# Patient Record
Sex: FEMALE | Employment: UNEMPLOYED | URBAN - METROPOLITAN AREA
[De-identification: names, ages, dates, MRNs, and addresses within clinical notes are randomized per-mention and may not be internally consistent; named-entity substitution may affect disease eponyms.]

---

## 2021-03-10 ENCOUNTER — OFFICE VISIT (OUTPATIENT)
Dept: FAMILY MEDICINE CLINIC | Age: 26
End: 2021-03-10
Payer: COMMERCIAL

## 2021-03-10 VITALS
HEART RATE: 80 BPM | OXYGEN SATURATION: 98 % | HEIGHT: 64 IN | BODY MASS INDEX: 19.81 KG/M2 | TEMPERATURE: 96.9 F | WEIGHT: 116 LBS | DIASTOLIC BLOOD PRESSURE: 64 MMHG | SYSTOLIC BLOOD PRESSURE: 80 MMHG

## 2021-03-10 DIAGNOSIS — Z00.00 ANNUAL PHYSICAL EXAM: Primary | ICD-10-CM

## 2021-03-10 DIAGNOSIS — Z11.4 ENCOUNTER FOR SCREENING FOR HIV: ICD-10-CM

## 2021-03-10 DIAGNOSIS — N94.6 MENSES PAINFUL: ICD-10-CM

## 2021-03-10 DIAGNOSIS — K59.09 OTHER CONSTIPATION: ICD-10-CM

## 2021-03-10 DIAGNOSIS — Z83.49 FAMILY HISTORY OF THYROID DYSFUNCTION: ICD-10-CM

## 2021-03-10 DIAGNOSIS — Z11.59 NEED FOR HEPATITIS C SCREENING TEST: ICD-10-CM

## 2021-03-10 PROCEDURE — 99385 PREV VISIT NEW AGE 18-39: CPT | Performed by: FAMILY MEDICINE

## 2021-03-10 PROCEDURE — 36415 COLL VENOUS BLD VENIPUNCTURE: CPT | Performed by: FAMILY MEDICINE

## 2021-03-10 RX ORDER — NORETHINDRONE ACETATE AND ETHINYL ESTRADIOL AND FERROUS FUMARATE 1MG-20(21)
KIT ORAL
COMMUNITY
Start: 2021-03-01 | End: 2021-08-20

## 2021-03-10 SDOH — HEALTH STABILITY: MENTAL HEALTH: HOW OFTEN DO YOU HAVE A DRINK CONTAINING ALCOHOL?: NOT ASKED

## 2021-03-10 ASSESSMENT — PATIENT HEALTH QUESTIONNAIRE - PHQ9
SUM OF ALL RESPONSES TO PHQ9 QUESTIONS 1 & 2: 0
SUM OF ALL RESPONSES TO PHQ QUESTIONS 1-9: 0

## 2021-03-10 NOTE — PROGRESS NOTES
Patient: Анна Chavez is a 22 y. 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 Is from [facility]. Has occ mild HAs, resolve with ibu. Saw derm x 2 for psoriasis. Is unsure of name of cream and foam but they are effective. MAunt and cousin have thyroid d/o. Pt is curious. Has long standing constipation. Mg+ helpful. Had gardisil vaccines in high school and last vaccine in college. Current Outpatient Medications   Medication Sig Dispense Refill    AUROVELA FE 1/20 1-20 MG-MCG per tablet        No current facility-administered medications for this visit. Patient's past medical history,surgical history, family history, medications,  and allergies  were all reviewed and updated as appropriate today. Review of Systems  abv    Physical Exam  Constitutional:       General: She is not in acute distress. Appearance: Normal appearance. She is well-developed. She is not ill-appearing. HENT:      Head: Normocephalic and atraumatic. Right Ear: Tympanic membrane, ear canal and external ear normal.      Left Ear: Tympanic membrane, ear canal and external ear normal.   Eyes:      General: No scleral icterus. Right eye: No discharge. Left eye: No discharge. Extraocular Movements: Extraocular movements intact. Conjunctiva/sclera: Conjunctivae normal.   Neck:      Musculoskeletal: Normal range of motion and neck supple. Vascular: No carotid bruit. Comments: Neg TMG  Cardiovascular:      Rate and Rhythm: Normal rate and regular rhythm. Pulses: Normal pulses. Heart sounds: Normal heart sounds. No murmur. Pulmonary:      Effort: Pulmonary effort is normal. No respiratory distress. Breath sounds: Normal breath sounds.    Abdominal:      General: Bowel sounds are normal. There is no distension. Palpations: Abdomen is soft. There is no mass. Tenderness: There is no abdominal tenderness. Musculoskeletal: Normal range of motion. Right lower leg: No edema. Left lower leg: No edema. Lymphadenopathy:      Cervical: No cervical adenopathy. Skin:     General: Skin is warm and dry. Capillary Refill: Capillary refill takes less than 2 seconds. Coloration: Skin is not jaundiced or pale. Findings: No rash. Neurological:      General: No focal deficit present. Mental Status: She is alert and oriented to person, place, and time. Cranial Nerves: No cranial nerve deficit. Deep Tendon Reflexes: Reflexes are normal and symmetric. Psychiatric:         Mood and Affect: Mood normal.         Behavior: Behavior normal.         Thought Content: Thought content normal.         Judgment: Judgment normal.           BP 80/64   Pulse 80   Temp 96.9 °F (36.1 °C) (Temporal)   Ht 5' 4\" (1.626 m)   Wt 116 lb (52.6 kg)   SpO2 98%   BMI 19.91 kg/m²     Assessment/Plan:    Ariana Giang was seen today for established new doctor.     Diagnoses and all orders for this visit:    Annual physical exam  -     Lipid Panel  -     Comprehensive Metabolic Panel  -     TSH without Reflex  -     T4, Free    Other constipation  -     TSH without Reflex  -     T4, Free    Family history of thyroid dysfunction  -     TSH without Reflex  -     T4, Free    Encounter for screening for HIV  -     HIV Screen    Need for hepatitis C screening test  -     Hepatitis C Antibody

## 2021-03-10 NOTE — PATIENT INSTRUCTIONS
Please let me know the dates of your varicella vaccine (chicken pox), HPV (gardasil), Tdap (tetanus and pertussis).

## 2021-03-10 NOTE — PROGRESS NOTES
General: Bowel sounds are normal. There is no distension. Palpations: Abdomen is soft. There is no mass. Tenderness: There is no abdominal tenderness. Musculoskeletal: Normal range of motion. Right lower leg: No edema. Left lower leg: No edema. Lymphadenopathy:      Cervical: No cervical adenopathy. Skin:     General: Skin is warm and dry. Capillary Refill: Capillary refill takes less than 2 seconds. Coloration: Skin is not jaundiced or pale. Findings: No rash. Neurological:      General: No focal deficit present. Mental Status: She is alert and oriented to person, place, and time. Cranial Nerves: No cranial nerve deficit. Deep Tendon Reflexes: Reflexes are normal and symmetric. Psychiatric:         Mood and Affect: Mood normal.         Behavior: Behavior normal.         Thought Content: Thought content normal.         Judgment: Judgment normal.           BP 80/64   Pulse 80   Temp 96.9 °F (36.1 °C) (Temporal)   Ht 5' 4\" (1.626 m)   Wt 116 lb (52.6 kg)   SpO2 98%   BMI 19.91 kg/m²     Assessment/Plan:    Fletcher Adam was seen today for established new doctor.     Diagnoses and all orders for this visit:    Annual physical exam  -     Lipid Panel  -     Comprehensive Metabolic Panel  -     TSH without Reflex  -     T4, Free    Other constipation  -     TSH without Reflex  -     T4, Free    Family history of thyroid dysfunction  -     TSH without Reflex  -     T4, Free    Encounter for screening for HIV  -     HIV Screen    Need for hepatitis C screening test  -     Hepatitis C Antibody    Menses painful  -     [address] [facility], DO, Gynecology, [facility]      nonfasting

## 2021-03-11 ENCOUNTER — PATIENT MESSAGE (OUTPATIENT)
Dept: FAMILY MEDICINE CLINIC | Age: 26
End: 2021-03-11

## 2021-03-11 LAB
A/G RATIO: 1.7 (ref 1.1–2.2)
ALBUMIN SERPL-MCNC: 4.3 G/DL (ref 3.4–5)
ALP BLD-CCNC: 41 U/L (ref 40–129)
ALT SERPL-CCNC: 11 U/L (ref 10–40)
ANION GAP SERPL CALCULATED.3IONS-SCNC: 9 MMOL/L (ref 3–16)
AST SERPL-CCNC: 17 U/L (ref 15–37)
BILIRUB SERPL-MCNC: 0.6 MG/DL (ref 0–1)
BUN BLDV-MCNC: 13 MG/DL (ref 7–20)
CALCIUM SERPL-MCNC: 9 MG/DL (ref 8.3–10.6)
CHLORIDE BLD-SCNC: 101 MMOL/L (ref 99–110)
CHOLESTEROL, TOTAL: 265 MG/DL (ref 0–199)
CO2: 27 MMOL/L (ref 21–32)
CREAT SERPL-MCNC: 0.6 MG/DL (ref 0.6–1.1)
GFR AFRICAN AMERICAN: >60
GFR NON-AFRICAN AMERICAN: >60
GLOBULIN: 2.5 G/DL
GLUCOSE BLD-MCNC: 93 MG/DL (ref 70–99)
HDLC SERPL-MCNC: 63 MG/DL (ref 40–60)
HEPATITIS C ANTIBODY INTERPRETATION: NORMAL
HIV AG/AB: NORMAL
HIV ANTIGEN: NORMAL
HIV-1 ANTIBODY: NORMAL
HIV-2 AB: NORMAL
LDL CHOLESTEROL CALCULATED: 182 MG/DL
POTASSIUM SERPL-SCNC: 4.3 MMOL/L (ref 3.5–5.1)
SODIUM BLD-SCNC: 137 MMOL/L (ref 136–145)
T4 FREE: 1.3 NG/DL (ref 0.9–1.8)
TOTAL PROTEIN: 6.8 G/DL (ref 6.4–8.2)
TRIGL SERPL-MCNC: 99 MG/DL (ref 0–150)
TSH SERPL DL<=0.05 MIU/L-ACNC: 1.35 UIU/ML (ref 0.27–4.2)
VLDLC SERPL CALC-MCNC: 20 MG/DL

## 2021-03-12 NOTE — TELEPHONE ENCOUNTER
From: Heidy Fernandez  To: Fiorella Valenzuela MD  Sent: 3/11/2021 8:47 PM EST  Subject: Test Results Question    Hi Dr. Luis Antonio Gaitan,   Thank you for all of your help at my appointment the other day. I appreciate your feedback on my test results and I am wondering if it is possible to get a more full thyroid panel including TSH, TPOab, TgAB, TSI, Free T3, Free T4, Reverse T3, T3, T4, Total T3? Thank you!   Heidy Fernandez

## 2021-08-20 ENCOUNTER — APPOINTMENT (OUTPATIENT)
Dept: ULTRASOUND IMAGING | Age: 26
End: 2021-08-20
Payer: COMMERCIAL

## 2021-08-20 ENCOUNTER — HOSPITAL ENCOUNTER (EMERGENCY)
Age: 26
Discharge: HOME | End: 2021-08-20
Payer: COMMERCIAL

## 2021-08-20 ENCOUNTER — APPOINTMENT (OUTPATIENT)
Dept: CT IMAGING | Age: 26
End: 2021-08-20
Payer: COMMERCIAL

## 2021-08-20 VITALS
HEART RATE: 76 BPM | WEIGHT: 120 LBS | DIASTOLIC BLOOD PRESSURE: 73 MMHG | RESPIRATION RATE: 16 BRPM | OXYGEN SATURATION: 100 % | HEIGHT: 64 IN | TEMPERATURE: 98.2 F | SYSTOLIC BLOOD PRESSURE: 105 MMHG | BODY MASS INDEX: 20.49 KG/M2

## 2021-08-20 DIAGNOSIS — R11.0 NAUSEA: ICD-10-CM

## 2021-08-20 DIAGNOSIS — N83.299 FUNCTIONAL CYST OF OVARY: ICD-10-CM

## 2021-08-20 DIAGNOSIS — N83.292 COMPLEX CYST OF LEFT OVARY: ICD-10-CM

## 2021-08-20 DIAGNOSIS — R10.30 LOWER ABDOMINAL PAIN: Primary | ICD-10-CM

## 2021-08-20 LAB
A/G RATIO: 1.6 (ref 1.1–2.2)
ALBUMIN SERPL-MCNC: 4.5 G/DL (ref 3.4–5)
ALP BLD-CCNC: 52 U/L (ref 40–129)
ALT SERPL-CCNC: 9 U/L (ref 10–40)
ANION GAP SERPL CALCULATED.3IONS-SCNC: 10 MMOL/L (ref 3–16)
AST SERPL-CCNC: 17 U/L (ref 15–37)
BASOPHILS ABSOLUTE: 0.1 K/UL (ref 0–0.2)
BASOPHILS RELATIVE PERCENT: 0.6 %
BILIRUB SERPL-MCNC: 2.6 MG/DL (ref 0–1)
BILIRUBIN URINE: NEGATIVE
BLOOD, URINE: NEGATIVE
BUN BLDV-MCNC: 11 MG/DL (ref 7–20)
CALCIUM SERPL-MCNC: 9.4 MG/DL (ref 8.3–10.6)
CHLORIDE BLD-SCNC: 103 MMOL/L (ref 99–110)
CLARITY: CLEAR
CO2: 25 MMOL/L (ref 21–32)
COLOR: YELLOW
CREAT SERPL-MCNC: 0.6 MG/DL (ref 0.6–1.1)
EOSINOPHILS ABSOLUTE: 0 K/UL (ref 0–0.6)
EOSINOPHILS RELATIVE PERCENT: 0.4 %
GFR AFRICAN AMERICAN: >60
GFR NON-AFRICAN AMERICAN: >60
GLOBULIN: 2.8 G/DL
GLUCOSE BLD-MCNC: 102 MG/DL (ref 70–99)
GLUCOSE URINE: NEGATIVE MG/DL
HCG(URINE) PREGNANCY TEST: NEGATIVE
HCT VFR BLD CALC: 43.6 % (ref 36–48)
HEMOGLOBIN: 15.1 G/DL (ref 12–16)
KETONES, URINE: NEGATIVE MG/DL
LEUKOCYTE ESTERASE, URINE: NEGATIVE
LIPASE: 28 U/L (ref 13–60)
LYMPHOCYTES ABSOLUTE: 1.6 K/UL (ref 1–5.1)
LYMPHOCYTES RELATIVE PERCENT: 17.3 %
MCH RBC QN AUTO: 32.2 PG (ref 26–34)
MCHC RBC AUTO-ENTMCNC: 34.6 G/DL (ref 31–36)
MCV RBC AUTO: 93.2 FL (ref 80–100)
MICROSCOPIC EXAMINATION: NORMAL
MONOCYTES ABSOLUTE: 0.5 K/UL (ref 0–1.3)
MONOCYTES RELATIVE PERCENT: 5.3 %
NEUTROPHILS ABSOLUTE: 7.2 K/UL (ref 1.7–7.7)
NEUTROPHILS RELATIVE PERCENT: 76.4 %
NITRITE, URINE: NEGATIVE
PDW BLD-RTO: 12.4 % (ref 12.4–15.4)
PH UA: 7.5 (ref 5–8)
PLATELET # BLD: 206 K/UL (ref 135–450)
PMV BLD AUTO: 8.2 FL (ref 5–10.5)
POTASSIUM REFLEX MAGNESIUM: 4.2 MMOL/L (ref 3.5–5.1)
PROTEIN UA: NEGATIVE MG/DL
RBC # BLD: 4.67 M/UL (ref 4–5.2)
SODIUM BLD-SCNC: 138 MMOL/L (ref 136–145)
SPECIFIC GRAVITY UA: <=1.005 (ref 1–1.03)
TOTAL PROTEIN: 7.3 G/DL (ref 6.4–8.2)
URINE REFLEX TO CULTURE: NORMAL
URINE TYPE: NORMAL
UROBILINOGEN, URINE: 0.2 E.U./DL
WBC # BLD: 9.4 K/UL (ref 4–11)

## 2021-08-20 PROCEDURE — 2580000003 HC RX 258: Performed by: PHYSICIAN ASSISTANT

## 2021-08-20 PROCEDURE — 74177 CT ABD & PELVIS W/CONTRAST: CPT

## 2021-08-20 PROCEDURE — 76830 TRANSVAGINAL US NON-OB: CPT

## 2021-08-20 PROCEDURE — 93975 VASCULAR STUDY: CPT

## 2021-08-20 PROCEDURE — 81003 URINALYSIS AUTO W/O SCOPE: CPT

## 2021-08-20 PROCEDURE — 6360000004 HC RX CONTRAST MEDICATION: Performed by: PHYSICIAN ASSISTANT

## 2021-08-20 PROCEDURE — 84703 CHORIONIC GONADOTROPIN ASSAY: CPT

## 2021-08-20 PROCEDURE — 85025 COMPLETE CBC W/AUTO DIFF WBC: CPT

## 2021-08-20 PROCEDURE — 76705 ECHO EXAM OF ABDOMEN: CPT

## 2021-08-20 PROCEDURE — 83690 ASSAY OF LIPASE: CPT

## 2021-08-20 PROCEDURE — 80053 COMPREHEN METABOLIC PANEL: CPT

## 2021-08-20 PROCEDURE — 99283 EMERGENCY DEPT VISIT LOW MDM: CPT

## 2021-08-20 RX ORDER — 0.9 % SODIUM CHLORIDE 0.9 %
1000 INTRAVENOUS SOLUTION INTRAVENOUS ONCE
Status: COMPLETED | OUTPATIENT
Start: 2021-08-20 | End: 2021-08-20

## 2021-08-20 RX ORDER — NAPROXEN 500 MG/1
500 TABLET ORAL 2 TIMES DAILY WITH MEALS
Qty: 30 TABLET | Refills: 0 | Status: SHIPPED | OUTPATIENT
Start: 2021-08-20

## 2021-08-20 RX ADMIN — SODIUM CHLORIDE 1000 ML: 9 INJECTION, SOLUTION INTRAVENOUS at 09:30

## 2021-08-20 RX ADMIN — IOPAMIDOL 75 ML: 755 INJECTION, SOLUTION INTRAVENOUS at 10:07

## 2021-08-20 ASSESSMENT — PAIN DESCRIPTION - LOCATION: LOCATION: ABDOMEN

## 2021-08-20 ASSESSMENT — ENCOUNTER SYMPTOMS
SHORTNESS OF BREATH: 0
NAUSEA: 1
DIARRHEA: 0
ABDOMINAL PAIN: 1
EYE PAIN: 0
BACK PAIN: 0
COUGH: 0
VOMITING: 0

## 2021-08-20 ASSESSMENT — PAIN SCALES - GENERAL: PAINLEVEL_OUTOF10: 5

## 2021-08-20 ASSESSMENT — PAIN DESCRIPTION - ORIENTATION: ORIENTATION: LOWER;MID

## 2021-08-20 ASSESSMENT — PAIN DESCRIPTION - PAIN TYPE: TYPE: ACUTE PAIN

## 2021-08-20 ASSESSMENT — PAIN DESCRIPTION - DESCRIPTORS: DESCRIPTORS: DULL

## 2021-08-20 NOTE — ED PROVIDER NOTES
[facility]  ED  EMERGENCY DEPARTMENT ENCOUNTER        Pt Name: Elsa Cary  MRN: [id number]  [place] 1995  Date of evaluation: 8/20/2021  Provider: KALPANA Guillaume  PCP: Tapan Victoria MD  Note Started: 9:54 AM EDT       YOU. I have evaluated this patient. My supervising physician was available for consultation. CHIEF COMPLAINT       Chief Complaint   Patient presents with    Abdominal Pain     mid low abdominal pain. called PCP and was told to go to the ED for further evaluation     Nausea       HISTORY OF PRESENT ILLNESS   (Location, Timing/Onset, Context/Setting, Quality, Duration, Modifying Factors, Severity, Associated Signs and Symptoms)  Note limiting factors. Chief Complaint: abdominal pain     Elsa Cary is a 32 y.o. female who presents to the emergency department for evaluation of lower abdominal pain symptoms began last night. Pain is located in the suprapubic and right lower quadrant. Reports associated nausea, dysuria. Rates her pain as a 5 out of 10 at rest. However with movement or pushing on it has gone up to a seven or an eight. Denies hematuria or vaginal bleeding/discharge. She does have a history of pain surrounding the time of her periods but states this is more severe. She called her PCP who advised going to the emergency department to rule out appendicitis. She denies fever, chills    Nursing Notes were all reviewed and agreed with or any disagreements were addressed in the HPI. REVIEW OF SYSTEMS    (2-9 systems for level 4, 10 or more for level 5)     Review of Systems   Constitutional: Negative for chills, fatigue and fever. Eyes: Negative for pain. Respiratory: Negative for cough and shortness of breath. Cardiovascular: Negative for chest pain. Gastrointestinal: Positive for abdominal pain and nausea. Negative for diarrhea and vomiting. Genitourinary: Positive for dysuria.    Musculoskeletal: Negative for back pain, neck pain and neck stiffness. Skin: Negative for rash. Neurological: Negative for dizziness and headaches. Psychiatric/Behavioral: Negative for confusion. Positives and Pertinent negatives as per HPI. Except as noted above in the ROS, all other systems were reviewed and negative. PAST MEDICAL HISTORY     Past Medical History:   Diagnosis Date    Headache     mild, takes ibu prn, asso'd with weather change    Psoriasis     dz'd age 16, on scalp, back, chest, flares with stress         SURGICAL HISTORY   History reviewed. No pertinent surgical history. CURRENTMEDICATIONS       Previous Medications    No medications on file         ALLERGIES     Pcn [penicillins]    FAMILYHISTORY       Family History   Problem Relation Age of Onset    Alzheimer's Disease Maternal Grandmother     Cancer Paternal Grandmother         breast cancer in 46s, doing well at 80    Colon Cancer Paternal Grandfather     Anxiety Disorder Father     Depression Father           SOCIAL HISTORY       Social History     Tobacco Use    Smoking status: Never Smoker    Smokeless tobacco: Never Used   Vaping Use    Vaping Use: Never used   Substance Use Topics    Alcohol use: Yes     Alcohol/week: 1.0 - 2.0 standard drinks     Types: 1 - 2 Glasses of wine per week     Comment: Social     Drug use: Never       SCREENINGS             PHYSICAL EXAM    (up to 7 for level 4, 8 or more for level 5)     ED Triage Vitals [08/20/21 0912]   BP Temp Temp Source Pulse Resp SpO2 Height Weight   105/72 98.2 °F (36.8 °C) Oral 90 14 100 % 5' 4\" (1.626 m) 120 lb (54.4 kg)       Physical Exam  Vitals and nursing note reviewed. Constitutional:       General: She is not in acute distress. Appearance: She is well-developed. She is not ill-appearing, toxic-appearing or diaphoretic. HENT:      Head: Normocephalic and atraumatic. Eyes:      General:         Right eye: No discharge. Left eye: No discharge.    Pulmonary:      Effort: No respiratory distress. Breath sounds: No stridor. Abdominal:      Tenderness: There is abdominal tenderness in the right lower quadrant and suprapubic area. There is no right CVA tenderness or left CVA tenderness. Musculoskeletal:         General: Normal range of motion. Cervical back: Normal range of motion and neck supple. Skin:     General: Skin is warm and dry. Coloration: Skin is not pale. Neurological:      Mental Status: She is alert and oriented to person, place, and time. Comments: No gross facial drooping. Moves all 4 extremities spontaneously. Psychiatric:         Behavior: Behavior normal.         DIAGNOSTIC RESULTS   LABS:    Labs Reviewed   COMPREHENSIVE METABOLIC PANEL W/ REFLEX TO MG FOR LOW K - Abnormal; Notable for the following components:       Result Value    Glucose 102 (*)     Total Bilirubin 2.6 (*)     ALT 9 (*)     All other components within normal limits    Narrative:     Performed at:  [place] [address] [employer]   Phone [phone]   CBC WITH AUTO DIFFERENTIAL    Narrative:     Performed at:  [place] [address] [employer]   Phone [phone]   LIPASE    Narrative:     Performed at:  [address], [facility] [employer]   Phone [phone]   URINE RT REFLEX TO CULTURE    Narrative:     Performed at:  [place] [address] [employer]   Phone [phone]   PREGNANCY, URINE    Narrative:     Performed at:  [address], [facility] [employer]   Phone [phone]       When ordered only abnormal lab results are displayed. All other labs were within normal range or not returned as of this dictation. EKG:  When ordered, EKG's are interpreted by the Emergency Department Physician in the absence of a cardiologist.  Please see their note for interpretation of EKG. RADIOLOGY:   Non-plain film images such as CT, Ultrasound and MRI are read by the radiologist. Plain radiographic images are visualized and preliminarily interpreted by the ED Provider with the below findings:        Interpretation per the Radiologist below, if available at the time of this note:    [address]   Final Result   Unremarkable right upper quadrant ultrasound. US DUP ABD PEL RETRO SCROT COMPLETE   Final Result   3 cm complex cystic lesion within the left ovary, for which follow-up   recommendations are as below. Complex cyst or cystic neoplasm are   considerations. 1.8 cm simple right ovarian cyst, for which ultrasound follow-up is not   mandatory. Flow was seen within each ovary, arguing against acute torsion. RECOMMENDATIONS:   3 cm indeterminate ovarian cyst.  Recommend pelvic US follow-up in 6-12   weeks; if unchanged, continue follow-up with US OR MRI with IV contrast - if   follow-up studies do not confirm endometrioma or dermoid, consider surgical   evaluation. Reference: Radiology 2010 Sep;256(3):383-38            US NON OB TRANSVAGINAL   Final Result   3 cm complex cystic lesion within the left ovary, for which follow-up   recommendations are as below. Complex cyst or cystic neoplasm are   considerations. 1.8 cm simple right ovarian cyst, for which ultrasound follow-up is not   mandatory. Flow was seen within each ovary, arguing against acute torsion. RECOMMENDATIONS:   3 cm indeterminate ovarian cyst.  Recommend pelvic US follow-up in 6-12   weeks; if unchanged, continue follow-up with US OR MRI with IV contrast - if   follow-up studies do not confirm endometrioma or dermoid, consider surgical   evaluation. Reference: Radiology 2010 Sep;256(3):649-44            CT ABDOMEN PELVIS W IV CONTRAST Additional Contrast? None   Final Result   1.  No findings to indicate appendicitis   2. Mild periportal low attenuation in the liver suggests periportal edema   which can be seen in acute hepatocellular disease. Correlate with LFTs           No results found. PROCEDURES   Unless otherwise noted below, none     Procedures    CRITICAL CARE TIME   N/A    CONSULTS:  None      EMERGENCY DEPARTMENT COURSE and DIFFERENTIAL DIAGNOSIS/MDM:   Vitals:    Vitals:    08/20/21 0912 08/20/21 1033   BP: 105/72 102/65   Pulse: 90 86   Resp: 14 16   Temp: 98.2 °F (36.8 °C)    TempSrc: Oral    SpO2: 100% 100%   Weight: 120 lb (54.4 kg)    Height: 5' 4\" (1.626 m)        Patient was given the following medications:  Medications   0.9 % sodium chloride bolus (0 mLs Intravenous Stopped 8/20/21 1033)   iopamidol (ISOVUE-370) 76 % injection 75 mL (75 mLs Intravenous Given 8/20/21 1007)           Differential diagnosis: Ischemic Bowel, Bowel Obstruction, PUD, GERD, Acute Cholecystitis, Pancreatitis, Hepatitis, Colitis, SMA Syndrome, Mesenteric Steal Syndrome, Splanchnic Vein Thrombosis, Acute Appendicitis, Diverticulitis, IBS, Constipation, Pyelonephritis, UTI, STD    Patient seen and examined today for RLQ/suprapubic pain. See HPI for patient presentation. Patient is in no acute distress, nontoxic, afebrile with unremarkable vital signs. I have reviewed the patient's laboratory results. The patient has normal WBCs, hematocrit and platelets. They have no severe electrolyte abnormality or renal impairment. Elevated bilirubin at 2.6 with normal LFTs. Lipase was normal. Urinalysis shows no sign of infection and urine HCG is negative. CT was negative for acute appendicitis. She had suggestion of mild periportal edema on CT however no elevation of LFTs, her bilirubin was elevated, thus RUQ US obtained which was negative for any acute findings in gallbladder/liver. Advised follow up with PCP for reevaluation of elevated bilirubin.   US showed 3 cm complex left ovarian cyst and 1.8 cm simple right ovarian cyst. Discussed findings with patient including OBGYN referral and follow up for re-imaging in the next few weeks. Prescribed naproxen. Patient declined pain medication in the ED and was given heating pad. Instructed to return if she has any worsening pelvic pain or symptoms. The patient is nontoxic with a reassuring abdominal exam. she has remained hemodynamically stable throughout her ED course. Based on history, physical exam, risk factors, and tests my suspicion for bowel obstruction, incarcerated hernia, acute pancreatitis, intra-abdominal abscess, perforated viscus, diverticulitis, cholecystitis, appendicitis is very low. There is no evidence of peritonitis, sepsis or toxicity at this time and I see nothing that would suggest an acute abdomen at this time. I believe patient's presentation does not warrant further workup in the emergency department and is stable for discharge home. I discussed with Camryn Waldron and/or family the exam results, diagnosis, care, prognosis, reasons to return to the emergency department including worsening pain, high fevers, intractable vomiting or bleeding, or any new or worsening symptoms, and the importance of follow up with provider in 24-48 hours. They verbalized understanding and were discharged in stable condition. Camryn Waldron is well appearing, non-toxic, and afebrile at the time of discharge. FINAL IMPRESSION      1. Lower abdominal pain    2. Nausea    3. Complex cyst of left ovary    4.  Functional cyst of ovary, right          DISPOSITION/PLAN   DISPOSITION Decision To Discharge 08/20/2021 12:28:12 PM      PATIENT REFERRED TO:  Franky Bray MD  [address], [address]  [phone]      ED follow up with SACHA BLUM [facility] for further evaluation of complex left ovarian cyst    Pearl Ly MD  [address] [facility]  [phone]      ED follow up    [facility]  ED  [place] [address] [facility]  [phone]    If symptoms worsen      DISCHARGE MEDICATIONS:  New Prescriptions    NAPROXEN (NAPROSYN) 500 MG TABLET    Take 1 tablet by mouth 2 times daily (with meals)       DISCONTINUED MEDICATIONS:  Discontinued Medications    AUROVELA FE 1/20 1-20 MG-MCG PER TABLET                  (Please note that portions of this note were completed with a voice recognition program.  Efforts were made to edit the dictations but occasionally words are mis-transcribed.)    Edison Severin, PA (electronically signed)            Edison Severin, PA  08/20/21 9268

## 2021-10-12 ENCOUNTER — OFFICE VISIT (OUTPATIENT)
Dept: BEHAVIORAL/MENTAL HEALTH CLINIC | Age: 26
End: 2021-10-12
Payer: COMMERCIAL

## 2021-10-12 DIAGNOSIS — F43.21 ADJUSTMENT DISORDER WITH DEPRESSED MOOD: ICD-10-CM

## 2021-10-12 DIAGNOSIS — Z56.0 PROBLEM WITH BEING UNEMPLOYED: ICD-10-CM

## 2021-10-12 DIAGNOSIS — F41.9 ANXIETY: Primary | ICD-10-CM

## 2021-10-12 PROCEDURE — 90791 PSYCH DIAGNOSTIC EVALUATION: CPT | Performed by: COUNSELOR

## 2021-10-12 SDOH — ECONOMIC STABILITY - INCOME SECURITY: UNEMPLOYMENT, UNSPECIFIED: Z56.0

## 2021-10-12 NOTE — PROGRESS NOTES
[facility]    Time Start: 9:00am    Time End: 10:00am  Date of Service:  10/12/2021    Basis of Evaluation:   [x]  Clinical Interview  [x]  Review of Medical Record    [x] Patient Health Questionnaire (PHQ)  []  Interview family member    Presenting Concerns:  Mariam De Santiago is a 32 y.o. who was self-referred, due to concerns about adjustment and anxiety. Moy Llamas reported the following symptoms of depression, related to adjustment: anhedonia, depressed mood, feelings of worthlessness/guilt and hopelessness. She also reported crying spells, sense of worthlessness, sense of hopelessness, irritability, pessimism, lack of pleasure, sad mood and anxiousness. In addition, Moy Llamas reported symptoms of excessive worry, agitation and depression. There is no evidence of toro or a thought disorder. She reported that her symptoms began [facility], but has gotten worse since moving to [place], [place]. \" Additional exacerbating stressors include family issues (strained relationship with parents, specifically her mother), employment concern (does not perceive she can work due to [employer] current job as a ); reports chronic GI pain; and recent move ( , Aarti, in December 2020, and relocated to [place], [place], from [place] in January, 2021). Previous behavioral health treatment history: None  has a current medication list which includes the following prescription(s): naproxen.     PHQ Scores 10/16/2021 3/10/2021   PHQ2 Score 2 0   PHQ9 Score 2 0     Interpretation of Total Score Depression Severity: 1-4 = Minimal depression, 5-9 = Mild depression, 10-14 = Moderate depression, 15-19 = Moderately severe depression, 20-27 = Severe depression    Moy Llamas    Mental Status:  Appearance: age appropriate, casually dressed and within normal Limits   Affect:  consistent with expectations based upon mood   Attitude: Cooperative, [place] and Friendly   Mood:   Dysphoric, Apathetic and Anxious   Thought Process:  Linear and goal directed   Delusions: no evidence of delusions   Perceptions: No perceptual disturbance   Behavior:   open, friendly and cooperative   Psychomotor: Within normal limits   Speech: Within normal limits   Eye Contact: good   Orientation:  oriented to person, place, time, and general circumstances   Judgment & Insight:  normal insight and judgment     Risk Assessment:  Current Suicide Risk:  no suicidal ideation, nonsuicidal morbid ideation  Current Homicide Risk:  no homocidal ideation    Carlene Price reported no previous history of suicidal ideation or behavior. Social History/Functioning:  Carlene Price is currently living with family (, Gino Locke) and reported a good social support network (, Gino Locke; several close friends from [place]; several friends in [place]). .She denied any current cultural or spiritual concerns. Social History     Socioeconomic History    Marital status: Unknown     Spouse name: Not on file    Number of children: Not on file    Years of education: Not on file    Highest education level: Not on file   Occupational History    Not on file   Tobacco Use    Smoking status: Never Smoker    Smokeless tobacco: Never Used   Vaping Use    Vaping Use: Never used   Substance and Sexual Activity    Alcohol use: Yes     Alcohol/week: 1.0 - 2.0 standard drinks     Types: 1 - 2 Glasses of wine per week     Comment: Social     Drug use: Never    Sexual activity: Not on file   Other Topics Concern    Not on file   Social History Narrative    Not on file     Social Determinants of Health     Financial Resource Strain:     Difficulty of Paying Living Expenses:    Food Insecurity:     Worried About Running Out of Food in the Last Year:     [address] in the Last Year:    Transportation Needs:     Lack of Transportation (Medical):      Lack of Transportation (Non-Medical):    Physical Activity:     Days of Exercise per Week:     Minutes of Exercise per Session:    Stress:     Feeling of Stress :    Social Connections:     Frequency of Communication with Friends and Family:     Frequency of Social Gatherings with Friends and Family:     Attends Evangelical Services:     Active Member of Clubs or Organizations:     Attends Club or Organization Meetings:     Marital Status:    Intimate Partner Violence:     Fear of Current or Ex-Partner:     Emotionally Abused:     Physically Abused:     Sexually Abused:        Past Medical History:   Diagnosis Date    Headache     mild, takes ibu prn, asso'd with weather change    Psoriasis     dz'd age 16, on scalp, back, chest, flares with stress       Diagnosis:   Diagnosis Orders   1. Anxiety     2. Adjustment disorder with depressed mood     3. Problem with being unemployed         Strengths: Motivated for treatment, Good social support, Good premorbid functioning and Appears psychologically minded   Limitations: None    Patient Response to Plan/Recommendations: Today, we discussed psychoeducation of treatment for anxiety, adjustment disorder with depressed mood, and problem with being unemployed. Primary goals of therapy were collaboratively identified as decrease symptoms of anxiety; relieve symptoms of adjustment disorder to help Shawn Guerra achieve a level of functioning comparable to what was demonstrated prior to relocation to [place], [place]; and career planning. Discussed confidentiality and limits of confidentiality as it applies to treatment within [facility] and my role as member of the medical treatment team.     Assessment, Impressions and Plan:  Shawn Guerra is a typical 32 y.o. old female who presents with mild anxiety and adjustment disorder symptoms that are somewhat interfering with her family and social functioning. Shawn Guerra expresses fair insight into her symptoms.  Shawn Guerra will likely benefit from Cognitive Behavioral therapy to challenge irrational thoughts, Psychoanalysis for insight development, and Dialectical Behavioral Therapy to address emotional management. Her symptoms are in the mild range and she will likely need 10-12 therapy sessions. Initial Treatment Plan/Recommendations:  No follow-ups on file. [facility].         Electronically signed by Joaquim Walker [facility], Maurice LPCC-S  Licensed Professional Clinical Counselor  Director of [address] [facility] and [facility] Medicine Residency Program at [facility]

## 2021-10-16 ASSESSMENT — PATIENT HEALTH QUESTIONNAIRE - PHQ9
SUM OF ALL RESPONSES TO PHQ QUESTIONS 1-9: 2
SUM OF ALL RESPONSES TO PHQ QUESTIONS 1-9: 2
SUM OF ALL RESPONSES TO PHQ9 QUESTIONS 1 & 2: 2
SUM OF ALL RESPONSES TO PHQ QUESTIONS 1-9: 2
2. FEELING DOWN, DEPRESSED OR HOPELESS: 1
1. LITTLE INTEREST OR PLEASURE IN DOING THINGS: 1

## 2021-10-19 ENCOUNTER — OFFICE VISIT (OUTPATIENT)
Dept: BEHAVIORAL/MENTAL HEALTH CLINIC | Age: 26
End: 2021-10-19
Payer: COMMERCIAL

## 2021-10-19 DIAGNOSIS — F43.21 ADJUSTMENT DISORDER WITH DEPRESSED MOOD: ICD-10-CM

## 2021-10-19 DIAGNOSIS — Z56.0 PROBLEM WITH BEING UNEMPLOYED: ICD-10-CM

## 2021-10-19 DIAGNOSIS — F41.9 ANXIETY: Primary | ICD-10-CM

## 2021-10-19 PROCEDURE — 90837 PSYTX W PT 60 MINUTES: CPT | Performed by: COUNSELOR

## 2021-10-19 SDOH — ECONOMIC STABILITY - INCOME SECURITY: UNEMPLOYMENT, UNSPECIFIED: Z56.0

## 2021-10-24 NOTE — PROGRESS NOTES
1000 [facility]    Time Start: 9:00am   Time End: 10:15am   Date of Service:  10/19/2021    Subjective:  Vanessa Nava shared she has been \"about the same\" since last session, and focused on her frustrations related to her realtor, whom cancelled an open house the day it was scheduled due to illness and has not been effectively communicating with her for weeks. Vanessa Nava and her  are interested in selling their home as expediently as possible, and perceive poor communication from their realtor ass resulting in delayed sale of their home. Vanessa Nava indicates she wants to spend the majority of her session discussing and processing issues related to growing up with \"difficult parents\" and the affects of this on her as an adult. As an only child, Vanessa Nava reports anticipatory anxiety over the stress in planning for caring for her aging parents. With further discussion, Vanessa Nava agreed that having a conversation with her parents about their preferences for elderly care would be the best method for gathering information to best make decisions in the present, in preparation for the future. Vanessa Nava committed to having the conversation with her mother first.     Additionally, Vanessa Nava commented that due to a high level of anxiety and depression presented by her parents while growing up, she often experiences a high level of anxiety when returning home for a visit. She experiences a high level of stress and tension before, during, and after visiting, and recognizes there was a \"sense of trauma\" that resurfaces in the company of her parents and family. Clinician discussed boundary setting with her parents, and provided examples, including where she stays when she visits her parents in [place]. Vanessa Nava was challenged to spend the next week considering boundaries that could be appropriately set with her parents. Follow-up next session.     Objective:  Mental Status:  Appearance: age appropriate, casually dressed and within normal Limits   Affect:  consistent with expectations based upon mood   Attitude: Cooperative and Engageable   Mood:   Dysphoric and Anxious   Thought Process:  Linear and goal directed   Delusions: no evidence of delusions   Perceptions: No perceptual disturbance   Behavior:   open and cooperative   Psychomotor: Within normal limits   Speech: Within normal limits   Eye Contact: good   Orientation:  oriented to person, place, time, and general circumstances   Judgment & Insight:  normal insight and judgment     Risk Assessment:  Current Suicide Risk:  no suicidal ideation, nonsuicidal morbid ideation  Current Homicide Risk:  no homocidal ideation    Diagnosis:   Diagnosis Orders   1. Anxiety     2. Adjustment disorder with depressed mood     3. Problem with being unemployed         Plan/Recommendations:  Continue with clinical therapy treatment using evidence-based techniques to treat anxiety, adjustment disorder with depressed mood, and problem with being unemployed. Primary goals of therapy remain collaboratively identified as develop coping skills to improve problem-solving skills, communication skills, and stress management skills, related to Adjustment Disorder, as well as to decrease symptoms of anxiety; and career planning. Harriett Looney Cognitive Behavioral therapy to challenge irrational thoughts, Psychoanalysis for insight development, and Dialectical Behavioral Therapy to address emotional management.       Electronically signed by Beti Qureshi [facility] KIMBERLEY MAR Ed.D., [facility]  Licensed Professional Clinical Counselor  Director of [address] [facility] and [facility] Medicine Residency Program at [address] [place] [address]

## 2021-10-26 ENCOUNTER — OFFICE VISIT (OUTPATIENT)
Dept: BEHAVIORAL/MENTAL HEALTH CLINIC | Age: 26
End: 2021-10-26
Payer: COMMERCIAL

## 2021-10-26 DIAGNOSIS — Z56.0 PROBLEM WITH BEING UNEMPLOYED: ICD-10-CM

## 2021-10-26 DIAGNOSIS — F41.9 ANXIETY: Primary | ICD-10-CM

## 2021-10-26 DIAGNOSIS — F43.21 ADJUSTMENT DISORDER WITH DEPRESSED MOOD: ICD-10-CM

## 2021-10-26 PROCEDURE — 90837 PSYTX W PT 60 MINUTES: CPT | Performed by: COUNSELOR

## 2021-10-26 SDOH — ECONOMIC STABILITY - INCOME SECURITY: UNEMPLOYMENT, UNSPECIFIED: Z56.0

## 2021-10-26 NOTE — PROGRESS NOTES
1000 [facility]    Time Start: 9:00am   Time End: 10:00am  Date of Service:  10/26/2021    Subjective:  Delbert Lemon reports she remains anxious about the possibility of moving to [facility] for her 's job next year if the option year of his contract is not picked up by his team. She communicated a fear of living far from her family, potentially having a baby while distanced from family, and feeling isolated and alone if no friends are acquired while abroad. Clinician challenged fears, specifically related to her expressed interest in visiting her family \"quarterly\" and having a baby, noting that the contract she explained would only have her and her  in [place] for a year. She agreed, and remains more cautious of the unknown, rather than fearful of not seeing family or starting a family while abroad. Delbert Lemon was asked to consider a scenario where she could be living in [place] (or anywhere her  signs a contract), and how it might be possible for her to lean into areas of identity development to make the most of her time wherever she moves. Delbert Lemon struggled to identify aspects of her identity, and feels there is Marizol Hives lot to figure out. \" Clinician invited Delbert Lemon to develop a list of her core values to share out next session. She was agreeable. Last, Delbert Lemon went into moderate detail about pain and discomfort she continues to feel in her lower abdomen, especially during menstruation. She finds it difficult to have sex without pain, and is worried there might be something medically wrong, or something that may prevent her from being able to start a family when ready. She explained her OB promoting the use of birth control; however, Delbert Lemon is not interested in using hormones to mitigate symptoms, and shared a preference to pursue a healthy diet and exercise plan, as well as holistic approaches to symptom relief instead.  Continue to assess symptoms each session. Objective:  Mental Status:  Appearance: age appropriate, casually dressed and within normal Limits   Affect:  consistent with expectations based upon mood   Attitude: Cooperative and Engageable   Mood:   Dysphoric, Apathetic and Anxious   Thought Process:  Linear and goal directed   Delusions: no evidence of delusions   Perceptions: No perceptual disturbance   Behavior:   open and cooperative   Psychomotor: Within normal limits   Speech: Within normal limits   Eye Contact: good   Orientation:  oriented to person, place, time, and general circumstances   Judgment & Insight:  normal insight and judgment     Risk Assessment:  Current Suicide Risk:  no suicidal ideation, nonsuicidal morbid ideation  Current Homicide Risk:  no homocidal ideation    Diagnosis:   Diagnosis Orders   1. Anxiety     2. Adjustment disorder with depressed mood     3. Problem with being unemployed         Plan/Recommendations:  Continue with clinical therapy treatment using evidence-based techniques to treat anxiety, adjustment disorder with depressed mood, and problem with being unemployed. Primary goals of therapy remain collaboratively identified as develop coping skills to improve problem-solving skills, communication skills, and stress management skills, related to Adjustment Disorder, as well as to decrease symptoms of anxiety; and career planning. Kenna Penny Cognitive Behavioral therapy to challenge irrational thoughts, Psychoanalysis for insight development, and Dialectical Behavioral Therapy to address emotional management.     Electronically signed by Francis Bazzi [facility] KIMBERLEY MAR Ed.D., LPCC-S  Licensed Professional Clinical Counselor  Director of [address] [facility] and [facility] Medicine Residency Program at [facility]

## 2021-11-02 ENCOUNTER — OFFICE VISIT (OUTPATIENT)
Dept: BEHAVIORAL/MENTAL HEALTH CLINIC | Age: 26
End: 2021-11-02
Payer: COMMERCIAL

## 2021-11-02 DIAGNOSIS — Z56.0 PROBLEM WITH BEING UNEMPLOYED: ICD-10-CM

## 2021-11-02 DIAGNOSIS — F41.9 ANXIETY: Primary | ICD-10-CM

## 2021-11-02 DIAGNOSIS — F43.21 ADJUSTMENT DISORDER WITH DEPRESSED MOOD: ICD-10-CM

## 2021-11-02 PROCEDURE — 90837 PSYTX W PT 60 MINUTES: CPT | Performed by: COUNSELOR

## 2021-11-02 SDOH — ECONOMIC STABILITY - INCOME SECURITY: UNEMPLOYMENT, UNSPECIFIED: Z56.0

## 2021-11-02 NOTE — PROGRESS NOTES
1000 [facility]    Time Start: 9:00am   Time End: 10:10am   Date of Service:  11/2/2021    Subjective:  Stephanie Wang reports she has been \"doing much better, lately\" AEB decrease in anxiety and depressive symptoms. Specifically, she shared having been really proud of herself for \"finding things to do\" while her  was away this past weekend for work. She felt \"empowered\" by not spending her time waiting for his return, but rather started [place] shopping, attending [employer], and going for walks. She reports continuing to work on developing her identity is noticeable in how she approaches her days and thinks about her future. Specifically, Stephanie Wang shared the following core values, as requested as homework from last session:   1. Learner (use experiences to help others)  2. Empathy  3. Human Connection (no shallow connection)  4. Alone time required  6. Refrain from attention  7. Fear of failure  8. [place] (I want to mentor others someday)  9. Family    Rossana recognized several of these are not actual core values, but insisted on keeping them on the list. She spent the majority of session processing her experience being an introvert who rarely connects with others, and the impact on her adjustment to moving to [facility]. Clinician provided psychoeducation regarding behavior styles, and invited Stephanie Wang to find an opportunity in the next week to vocally advocate for something she knows she wants, and/or wants to do. Stephanie Wang was agreeable. Follow-up next session. Objective:  Mental Status:  Appearance: age appropriate, casually dressed and within normal Limits   Affect:  consistent with expectations based upon mood   Attitude: Cooperative and Engageable   Mood:   Apathetic and Anxious   Thought Process:  Linear and goal directed   Delusions: no evidence of delusions   Perceptions: No perceptual disturbance   Behavior:   open and cooperative   Psychomotor:  Within normal limits   Speech: Within normal limits   Eye Contact: good   Orientation:  oriented to person, place, time, and general circumstances   Judgment & Insight:  normal insight and judgment     Risk Assessment:  Current Suicide Risk:  no suicidal ideation, nonsuicidal morbid ideation  Current Homicide Risk:  no homocidal ideation    Diagnosis:   Diagnosis Orders   1. Anxiety     2. Adjustment disorder with depressed mood     3. Problem with being unemployed         Plan/Recommendations:  Continue with clinical therapy treatment using evidence-based techniques to treat anxiety, adjustment disorder with depressed mood, and problem with being unemployed. Primary goals of therapy remain collaboratively identified as develop coping skills to improve problem-solving skills, communication skills, and stress management skills, related to Adjustment Disorder, as well as to decrease symptoms of anxiety; and career planning. Use Cognitive Behavioral therapy to challenge irrational thoughts, Psychoanalysis for insight development, and Dialectical Behavioral Therapy to address emotional management.     Electronically signed by Dalton Rapp [facility] KIMBERLEY MAR Ed.D., LPCC-S  Licensed Professional Clinical Counselor  Director of [address] [facility] and [facility] Medicine Residency Program at [facility]

## 2021-11-09 ENCOUNTER — OFFICE VISIT (OUTPATIENT)
Dept: BEHAVIORAL/MENTAL HEALTH CLINIC | Age: 26
End: 2021-11-09
Payer: COMMERCIAL

## 2021-11-09 DIAGNOSIS — F43.10 PTSD (POST-TRAUMATIC STRESS DISORDER): ICD-10-CM

## 2021-11-09 DIAGNOSIS — F41.9 ANXIETY: Primary | ICD-10-CM

## 2021-11-09 DIAGNOSIS — F43.21 ADJUSTMENT DISORDER WITH DEPRESSED MOOD: ICD-10-CM

## 2021-11-09 DIAGNOSIS — Z56.0 PROBLEM WITH BEING UNEMPLOYED: ICD-10-CM

## 2021-11-09 PROCEDURE — 90837 PSYTX W PT 60 MINUTES: CPT | Performed by: COUNSELOR

## 2021-11-09 SDOH — ECONOMIC STABILITY - INCOME SECURITY: UNEMPLOYMENT, UNSPECIFIED: Z56.0

## 2021-11-14 NOTE — PROGRESS NOTES
1000 [facility]    Time Start: 9:00am   Time End: 10:15am  Date of Service:  11/9/2021    Subjective:  Cherry Cantrell reports she has been [place] bit overwhelmed\" since last session with increased anxiety and depression related to her parents visiting this past weekend. They traveled from [facility] to [facility], to attend a college football game, as well as to attend her 's last soccer game of the season. Specifically, Cherry Cantrell described in full detail the scope of behaviors that trigger traumatic memories from her childhood, including her mother's obsessive bx's w/ being frugal, as well as her mother's anorexia and bulimia. Cherry Cantrell described the affect these behaviors had on her as a child, as well as how they get \"re-triggered\" when around her parents. She noticed her mom continues to present with these behaviors, and they \"caused me a lot of stress. \" Further, Cherry Cantrell mentioned her family does not typically discuss mental health, Brettmaximino Ureña sometimes with my dad, but he's a depressed and very anxious person himself. \"    Cherry Cantrell also reports feeling anxious about the uncertainty of where her  will be playing soccer next year. She hopes he receives a contract from a team in the [address] [place] [address] close to either of their families. During her 's off-season, she plans to travel with him to visit family in [place], as well as to work on mindfulness meditation (to mitigate lower abdominal pain during menstruation, and decrease stress), as well as to continue with identity development work. Cherry Cantrell is interested in virtual sessions while they travel during the off-season, and shared she would follow-up, as needed.      Objective:  Mental Status:  Appearance: age appropriate, casually dressed and within normal Limits   Affect:  consistent with expectations based upon mood   Attitude: Cooperative and Engageable   Mood:   Apathetic and Anxious   Thought Process:  Linear and goal directed   Delusions: no evidence of delusions   Perceptions: No perceptual disturbance   Behavior:   open and cooperative   Psychomotor: Within normal limits   Speech: Within normal limits   Eye Contact: good   Orientation:  oriented to person, place, time, and general circumstances   Judgment & Insight:  normal insight and judgment     Risk Assessment:  Current Suicide Risk:  no suicidal ideation, nonsuicidal morbid ideation  Current Homicide Risk:  no homocidal ideation    Diagnosis:   Diagnosis Orders   1. Anxiety     2. Adjustment disorder with depressed mood     3. PTSD (post-traumatic stress disorder)     4. Problem with being unemployed         Plan/Recommendations:  Continue with clinical therapy treatment using evidence-based techniques to treat PTSD, anxiety, adjustment disorder with depressed mood, and problem with being unemployed. Primary goals of therapy remain collaboratively identified as develop coping skills to improve problem-solving skills, communication skills, and stress management skills, related to Adjustment Disorder, as well as to decrease symptoms of anxiety; and career planning. Use trauma-focused Cognitive Behavioral therapy to challenge irrational thoughts and process trauma, Psychoanalysis for insight development, and Dialectical Behavioral Therapy to address emotional management.         Electronically signed by Beti Qureshi [facility] KIMBERLEY MAR Ed.D., [facility]  Licensed Professional Clinical Counselor  Director of [address] [facility] and [facility] Medicine Residency Program at [facility]